# Patient Record
Sex: FEMALE | Race: WHITE | NOT HISPANIC OR LATINO | Employment: FULL TIME
[De-identification: names, ages, dates, MRNs, and addresses within clinical notes are randomized per-mention and may not be internally consistent; named-entity substitution may affect disease eponyms.]

---

## 2017-11-26 ENCOUNTER — HEALTH MAINTENANCE LETTER (OUTPATIENT)
Age: 22
End: 2017-11-26

## 2018-12-24 ENCOUNTER — HOSPITAL ENCOUNTER (EMERGENCY)
Facility: HOSPITAL | Age: 23
Discharge: HOME | End: 2018-12-24
Attending: NURSE PRACTITIONER | Admitting: NURSE PRACTITIONER
Payer: COMMERCIAL

## 2018-12-24 VITALS
RESPIRATION RATE: 16 BRPM | SYSTOLIC BLOOD PRESSURE: 110 MMHG | TEMPERATURE: 96.3 F | DIASTOLIC BLOOD PRESSURE: 73 MMHG | HEART RATE: 82 BPM | OXYGEN SATURATION: 98 %

## 2018-12-24 DIAGNOSIS — J01.40 ACUTE PANSINUSITIS, RECURRENCE NOT SPECIFIED: ICD-10-CM

## 2018-12-24 PROCEDURE — 99203 OFFICE O/P NEW LOW 30 MIN: CPT | Mod: Z6 | Performed by: NURSE PRACTITIONER

## 2018-12-24 PROCEDURE — G0463 HOSPITAL OUTPT CLINIC VISIT: HCPCS

## 2018-12-24 RX ORDER — FLUTICASONE PROPIONATE 50 MCG
2 SPRAY, SUSPENSION (ML) NASAL 2 TIMES DAILY
Qty: 1 BOTTLE | Refills: 0 | Status: SHIPPED | OUTPATIENT
Start: 2018-12-24 | End: 2018-12-31

## 2018-12-24 ASSESSMENT — ENCOUNTER SYMPTOMS
NAUSEA: 0
APPETITE CHANGE: 1
SORE THROAT: 1
MYALGIAS: 1
CHOKING: 1
SHORTNESS OF BREATH: 1
HEADACHES: 1
FEVER: 1
SINUS PAIN: 1
ARTHRALGIAS: 1
DYSURIA: 0
WHEEZING: 0
ABDOMINAL PAIN: 0
DIARRHEA: 0
FACIAL SWELLING: 1
FREQUENCY: 0
FATIGUE: 1
CHILLS: 1
SINUS PRESSURE: 1
VOMITING: 0

## 2018-12-24 NOTE — ED AVS SNAPSHOT
[facility]  [address]  [place] [address]  Phone:  [phone]                                    Jyoti Huntley   MRN: [id number]    Department:  [facility]   Date of Visit:  12/24/2018           After Visit Summary Signature Page    I have received my discharge instructions, and my questions have been answered. I have discussed any challenges I see with this plan with the nurse or doctor.    ..........................................................................................................................................  Patient/Patient Representative Signature      ..........................................................................................................................................  Patient Representative Print Name and Relationship to Patient    ..................................................               ................................................  Date                                   Time    ..........................................................................................................................................  Reviewed by Signature/Title    ...................................................              ..............................................  Date                                               Time          22EPIC Rev 08/18

## 2018-12-24 NOTE — ED PROVIDER NOTES
History     Chief Complaint   Patient presents with     URI     HPI  Jyoti Yuko is a 23 year old female who presents today with a CC of sinus congestion, cough, facial pain, chest wall pain with cough x 10 + days.  She has been having low grade fevers and mild chills.  + body aches.  She has been taking advil, sudafed, mucinex and decongestant nasal spray without much improvement.  Appetite has been decreased, she has been pushing fluids.  No history of chronic illness.   No known exposures to ill contacts.      Problem List:    There are no active problems to display for this patient.       Past Medical History:    Past Medical History:   Diagnosis Date     Migraine 12/08/2011     PMS (premenstrual syndrome) 12/08/2011       Past Surgical History:    No past surgical history on file.    Family History:    Family History   Problem Relation Age of Onset     Other - See Comments Maternal Aunt         melanoma       Social History:  Marital Status:  Single [1]  Social History     Tobacco Use     Smoking status: Never Smoker     Tobacco comment: no passive exposure   Substance Use Topics     Alcohol use: Not on file     Drug use: Not on file        Medications:      amoxicillin-clavulanate (AUGMENTIN) 875-125 MG tablet   Diphenhydramine-APAP, sleep, (EXCEDRIN PM PO)   fluticasone (FLONASE) 50 MCG/ACT nasal spray   levonorgestrel (MIRENA) 20 MCG/24HR IUD   propranolol (INDERAL) 20 MG tablet         Review of Systems   Constitutional: Positive for appetite change, chills, fatigue and fever.   HENT: Positive for congestion, ear pain, facial swelling (mom reports generalized maxillary swelling under eyes), postnasal drip, sinus pressure, sinus pain and sore throat.    Respiratory: Positive for choking and shortness of breath (with activity). Negative for wheezing.    Gastrointestinal: Negative for abdominal pain, diarrhea, nausea and vomiting.   Genitourinary: Negative for dysuria, frequency and urgency.    Musculoskeletal: Positive for arthralgias and myalgias.   Skin: Negative for rash.   Neurological: Positive for headaches (sinus headache).       Physical Exam   BP: 110/73  Pulse: 82  Temp: 96.3  F (35.7  C)  Resp: 16  SpO2: 98 %      Physical Exam   Constitutional: She appears well-developed. She is cooperative. She does not appear ill. No distress.   HENT:   Head: Normocephalic and atraumatic.   Right Ear: Tympanic membrane, external ear and ear canal normal.   Left Ear: Tympanic membrane, external ear and ear canal normal.   Nose: Mucosal edema and rhinorrhea present. Right sinus exhibits maxillary sinus tenderness and frontal sinus tenderness. Left sinus exhibits maxillary sinus tenderness and frontal sinus tenderness.   Mouth/Throat: Uvula is midline and oropharynx is clear and moist.   Cardiovascular: Normal rate and regular rhythm.   Pulmonary/Chest: Effort normal and breath sounds normal.   Neurological: She is alert.   Skin: Skin is warm and dry.   Psychiatric: She has a normal mood and affect. Her behavior is normal.   Nursing note and vitals reviewed.      ED Course        Procedures      Assessments & Plan (with Medical Decision Making)     I have reviewed the nursing notes.    I have reviewed the findings, diagnosis, plan and need for follow up with the patient.  ASSESSMENT / PLAN:  (J01.40) Acute pansinusitis, recurrence not specified  Plan:  Augmentin as prescribed   Flonase as prescribed   Symptomatic treatments such as those listed below are recommended as indicated:  Take OTC motrin or tylenol as directed on packaging - as needed  Humidified air  May try safely elevating HOB or sleeping in recliner  May try OTC cold medicine as directed on bottle - check ingredients, many are multi symptom and may contain tylenol or motrin  Stay well hydrated, rest, wash hands often  Sinus saline nasal spray with suctioning or rinses such as a netti pot may help with sinus symptoms  Return to ED/UC if symptoms  worsen or concerns develop  Patient verbally educated and given written discharge instructions       Medication List      Started    amoxicillin-clavulanate 875-125 MG tablet  Commonly known as:  AUGMENTIN  1 tablet, Oral, 2 TIMES DAILY     fluticasone 50 MCG/ACT nasal spray  Commonly known as:  FLONASE  2 sprays, Both Nostrils, 2 TIMES DAILY            Final diagnoses:   Acute pansinusitis, recurrence not specified       12/24/2018   [facility]     Yanira De La Torre NP  12/26/18 [id number]

## 2018-12-24 NOTE — ED TRIAGE NOTES
Pt presents today with c/o nasal congestion, chest congestion, body aches, cough, and fevers. Started 1.5 weeks ago. Getting worse states pt.